# Patient Record
Sex: MALE | Race: WHITE | NOT HISPANIC OR LATINO | Employment: OTHER | URBAN - METROPOLITAN AREA
[De-identification: names, ages, dates, MRNs, and addresses within clinical notes are randomized per-mention and may not be internally consistent; named-entity substitution may affect disease eponyms.]

---

## 2024-02-19 ENCOUNTER — HOSPITAL ENCOUNTER (EMERGENCY)
Facility: CLINIC | Age: 39
Discharge: HOME | End: 2024-02-19
Attending: EMERGENCY MEDICINE | Admitting: EMERGENCY MEDICINE
Payer: OTHER GOVERNMENT

## 2024-02-19 ENCOUNTER — APPOINTMENT (OUTPATIENT)
Dept: ULTRASOUND IMAGING | Facility: CLINIC | Age: 39
End: 2024-02-19
Attending: EMERGENCY MEDICINE
Payer: OTHER GOVERNMENT

## 2024-02-19 VITALS
SYSTOLIC BLOOD PRESSURE: 120 MMHG | TEMPERATURE: 97.6 F | HEART RATE: 58 BPM | RESPIRATION RATE: 17 BRPM | DIASTOLIC BLOOD PRESSURE: 81 MMHG | OXYGEN SATURATION: 98 %

## 2024-02-19 DIAGNOSIS — I80.01 THROMBOPHLEBITIS OF SUPERFICIAL VEINS OF RIGHT LOWER EXTREMITY: ICD-10-CM

## 2024-02-19 PROCEDURE — 99284 EMERGENCY DEPT VISIT MOD MDM: CPT | Mod: 25

## 2024-02-19 PROCEDURE — 93971 EXTREMITY STUDY: CPT | Mod: RT

## 2024-02-19 ASSESSMENT — ACTIVITIES OF DAILY LIVING (ADL): ADLS_ACUITY_SCORE: 33

## 2024-02-19 ASSESSMENT — ENCOUNTER SYMPTOMS: BACK PAIN: 0

## 2024-02-19 NOTE — DISCHARGE INSTRUCTIONS
No blood clots in the deep veins.  Superficial thrombophlebitis is noted in the lateral right lower leg.  Warm compresses or warm soaks off-and-on can help with pain.  Tylenol or ibuprofen as tolerated can help with pain.  This usually resolves over several weeks.  See your doctor or clinic if not getting better.

## 2024-02-19 NOTE — ED TRIAGE NOTES
Pt arrives to ed with c/o of right lower leg pain 4/10. States he has been flying last couple of days. Denies sob, fall, or injury     Triage Assessment (Adult)       Row Name 02/19/24 0302          Triage Assessment    Airway WDL WDL        Respiratory WDL    Respiratory WDL WDL        Cardiac WDL    Cardiac WDL WDL        Cognitive/Neuro/Behavioral WDL    Cognitive/Neuro/Behavioral WDL WDL

## 2024-02-19 NOTE — ED PROVIDER NOTES
EMERGENCY DEPARTMENT ENCOUNTER      NAME: Jimy Nieto  AGE: 38 year old male  YOB: 1985  MRN: [id number]  EVALUATION DATE & TIME: 2/19/2024  3:04 AM    PCP: No primary care provider on file.    ED PROVIDER: Jaspreet Lucero M.D.      Chief Complaint   Patient presents with    Leg Pain         FINAL IMPRESSION:    1.  Acute right leg pain.  2.  Acute superficial thrombophlebitis, right leg.    ED COURSE & MEDICAL DECISION MAKING:    3:10 AM I met with the patient to gather history and to perform my initial exam. We discussed plans for the ED course, including diagnostic testing and treatment. PPE worn: cloth mask he is a non-smoker.  Other than recent travel no risk factors.  No personal or family history of blood clots.  He denies trauma or injury.  No back pain, chest pain, shortness of breath, or hemoptysis.  3:39 AM I checked on the patient.  No DVT.  Superficial thrombophlebitis is present.  Patient discharged home.  We talked about heat, Tylenol or ibuprofen.  Patient in agreement.    Pertinent Labs & Imaging studies reviewed. (See chart for details)  38 year old male presents to the Emergency Department for evaluation of right leg pain..  Patient recent traveling and flew back from [facility].  He notes a right calf pain in the last day.  He presents for evaluation.    At the conclusion of the encounter I discussed the results of all of the tests and the disposition. The questions were answered. The patient or family acknowledged understanding and was agreeable with the care plan.              Medical Decision Making  Obtained supplemental history:Supplemental history obtained?: Documented in chart  Reviewed external records: External records reviewed?: Documented in chart  Care impacted by chronic illness:N/A  Care significantly affected by social determinants of health:Access to Medical Care  Did you consider but not order tests?: Work up considered but not performed and  documented in chart, if applicable  Did you interpret images independently?: Independent interpretation of ECG and images noted in documentation, when applicable.  Consultation discussion with other provider:Did you involve another provider (consultant, MH, pharmacy, etc.)?: No  Anticipate discharge home pending negative evaluation.      MEDICATIONS GIVEN IN THE EMERGENCY:  Medications - No data to display    NEW PRESCRIPTIONS STARTED AT TODAY'S ER VISIT  New Prescriptions    No medications on file          =================================================================    HPI    Patient information was obtained from: The patient    Use of : N/A         Jimy Nieto is a 38 year old male with no pertinent history who presents to this ED via walk-in for evaluation of leg pain    The patient reports he just came back from [facility] today and is experiencing right calf pain. He denied any personal or family history of blood clots. No complaints of chest pain, back pain, or coughing up blood. He does not partake in tobacco or recreational drug usage    He does not identify any waxing or waning symptoms otherwise, exacerbating or alleviating features, associated symptoms except as mentioned.    REVIEW OF SYSTEMS   Review of Systems   Cardiovascular:  Negative for chest pain.   Musculoskeletal:  Negative for back pain.        Positive for right calf pain   All other systems reviewed and are negative.       PAST MEDICAL HISTORY:  No past medical history on file.    PAST SURGICAL HISTORY:  No past surgical history on file.        CURRENT MEDICATIONS:    No current outpatient medications on file.      ALLERGIES:  No Known Allergies    FAMILY HISTORY:  No family history on file.    SOCIAL HISTORY:    Rare alcohol.  No drugs or tobacco.    VITALS:  /76   Pulse 58   Temp 97.6  F (36.4  C) (Oral)   Resp 17   SpO2 99%     PHYSICAL EXAM    Vital Signs:  /76   Pulse 58   Temp 97.6  F (36.4  C)  (Oral)   Resp 17   SpO2 99%   General:  On entering the room he is in no apparent distress.    Neck:  Neck supple with full range of motion and nontender.    Back:  Back and spine are nontender.  No costovertebral angle tenderness.    HEENT:  Oropharynx clear with moist mucous membranes.  HEENT unremarkable.    Pulmonary:  Chest clear to auscultation without rhonchi rales or wheezing.    Cardiovascular:  Cardiac regular rate and rhythm without murmurs rubs or gallops.    Abdomen:  Abdomen soft nontender.  There is no rebound or guarding.    Muskuloskeletal:  He moves all 4 without any difficulty and has normal neurovascular exams.  Extremities without clubbing, cyanosis, or edema.  Legs and calves are nontender On the left, mildly tender to the posterior lateral aspect of the right calf.  Good pulse capillary fill.  Sensation intact soft touch.  No visible swelling.  Neuro:  He is alert and oriented ×3 and moves all extremities symmetrically.    Psych:  Normal affect.    Skin:  Unremarkable and warm and dry.       LAB:  All pertinent labs reviewed and interpreted.  Labs Ordered and Resulted from Time of ED Arrival to Time of ED Departure - No data to display    RADIOLOGY:  Reviewed all pertinent imaging. Please see official radiology report.  US Lower Extremity Venous Duplex Right   Preliminary Result   IMPRESSION:   1.  No deep venous thrombosis in the right lower extremity.   2.  Occlusive superficial thrombophlebitis of one of the venous varicosities in the upper lateral aspect of the right shin area.                 EKG:          PROCEDURES:         I, Roger Redman, am serving as a scribe to document services personally performed by Dr. Lucero based on my observation and the provider's statements to me. I, Jaspreet Lucero MD attest that Roger Redman is acting in a scribe capacity, has observed my performance of the services and has documented them in accordance with my direction.    Jaspreet Lucero M.D.  Emergency  Medicine  [facility] EMERGENCY ROOM  [address] [address]  [phone]  Dept: [phone]       Jaspreet Lucero MD  02/19/24 0344